# Patient Record
(demographics unavailable — no encounter records)

---

## 2024-11-04 NOTE — ADDENDUM
[FreeTextEntry1] : No email received.  Called patient several times after visit but no answer.  Left a message.  --BMM

## 2024-11-04 NOTE — ASSESSMENT
[FreeTextEntry1] : Patient called for medication refill.  I am unable to confirm his Stelara prescription as he has no physical documentation for outpatient pharmacy for me to call.  He does have an female with his hospital discharge paperwork that includes the prescription but I am unable to see that on his camera.  I recommended he forward the prescription information to [email or web address] for me to review to ensure appropriateness of prescribing an immunosuppressive medication.  He will send the prescription information.  In the meantime, I will refer for urgent GI follow-up.